# Patient Record
Sex: FEMALE | Race: WHITE | NOT HISPANIC OR LATINO | Employment: OTHER | URBAN - METROPOLITAN AREA
[De-identification: names, ages, dates, MRNs, and addresses within clinical notes are randomized per-mention and may not be internally consistent; named-entity substitution may affect disease eponyms.]

---

## 2021-05-19 PROBLEM — E78.2 HYPERLIPIDEMIA, MIXED: Status: ACTIVE | Noted: 2021-05-19

## 2021-05-19 PROBLEM — N39.0 RECURRENT UTI: Status: ACTIVE | Noted: 2021-05-19

## 2021-05-19 PROBLEM — I10 BENIGN ESSENTIAL HTN: Status: ACTIVE | Noted: 2021-05-19

## 2021-05-19 PROBLEM — F41.9 ANXIETY: Status: ACTIVE | Noted: 2021-05-19

## 2024-04-22 ENCOUNTER — PATIENT OUTREACH (OUTPATIENT)
Dept: ADMINISTRATIVE | Facility: HOSPITAL | Age: 69
End: 2024-04-22

## 2024-04-22 NOTE — PROGRESS NOTES
CMS/MSSP Non-compliant report chart audits for COLON CANCER SCREENING. Chart review completed for HM test overdue (mammograms, Colonoscopies, pap smears, DM labs, and/or EYE EXAMs)      Care Everywhere and media, updates requested and reviewed.      No documentation of colon cancer screening.  No documentation of breast cancer screening.    Due for Annual Medicare Wellness visit.    Health Maintenance Due   Topic Date Due    Hepatitis C Screening  Never done    COVID-19 Vaccine (1) Never done    Pneumococcal Vaccines (Age 65+) (1 of 2 - PCV) Never done    Mammogram  Never done    Shingles Vaccine (1 of 2) Never done    Hemoglobin A1c (Diabetic Prevention Screening)  Never done    DEXA Scan  Never done    Colorectal Cancer Screening  Never done    RSV Vaccine (Age 60+ and Pregnant patients) (1 - 1-dose 60+ series) Never done